# Patient Record
Sex: FEMALE
[De-identification: names, ages, dates, MRNs, and addresses within clinical notes are randomized per-mention and may not be internally consistent; named-entity substitution may affect disease eponyms.]

---

## 2018-03-16 PROBLEM — Z00.00 ENCOUNTER FOR PREVENTIVE HEALTH EXAMINATION: Status: ACTIVE | Noted: 2018-03-16

## 2019-07-17 ENCOUNTER — APPOINTMENT (OUTPATIENT)
Dept: HEART AND VASCULAR | Facility: CLINIC | Age: 69
End: 2019-07-17
Payer: MEDICARE

## 2019-07-17 VITALS — SYSTOLIC BLOOD PRESSURE: 130 MMHG | DIASTOLIC BLOOD PRESSURE: 80 MMHG | HEART RATE: 57 BPM

## 2019-07-17 PROCEDURE — 99214 OFFICE O/P EST MOD 30 MIN: CPT

## 2019-07-17 NOTE — ASSESSMENT
[FreeTextEntry1] : 69-year-old female with a past medical history of obesity, hypertension, hyperlipidemia, depression here for a followup evaluation and first visit with this provider\par \par LOERA\par --The patient reports worsening of her dyspnea on exertion recently associated with episodes of chest pain\par -In the setting of mildly elevated  right ventricular systolic pressures on echo today, rule out for exercise-induced pulmonary hypertension and CAD with a stress echocardiogram\par \par HTN\par -well controlled \par -cont with current medications\par \par HLD\par -fasting lipid panel\par \par LE edema\par -duplex lower extremities to r/o dvt in the setting of asymmetrical edema \par \par f/u in 1 month for stress echo results

## 2019-07-17 NOTE — PHYSICAL EXAM
[Heart Rate And Rhythm] : heart rate and rhythm were normal [Heart Sounds] : normal S1 and S2 [Murmurs] : no murmurs present [Respiration, Rhythm And Depth] : normal respiratory rhythm and effort [Exaggerated Use Of Accessory Muscles For Inspiration] : no accessory muscle use [Auscultation Breath Sounds / Voice Sounds] : lungs were clear to auscultation bilaterally [Abdomen Soft] : soft [Abdomen Tenderness] : non-tender [Abdomen Mass (___ Cm)] : no abdominal mass palpated [Nail Clubbing] : no clubbing of the fingernails [Cyanosis, Localized] : no localized cyanosis [Petechial Hemorrhages (___cm)] : no petechial hemorrhages [] : no ischemic changes [FreeTextEntry1] : 1+ pitting edema b/l ankles with R>L

## 2019-07-17 NOTE — HISTORY OF PRESENT ILLNESS
[FreeTextEntry1] : 69-year-old female with a past medical history of obesity,  hypertension, hyperlipidemia, depression here for a followup evaluation and first visit with this provider\par The patient reports episodes of shortness of breath when walking in the last 6 months. \par SHe reports that she can walk up to 5 blocks ut limited by shortness of breath. \par She also reports LE edema (chronic) with right leg> left \par \par PMH\par obesity\par HTN\par HLD\par deperssion\par  \par \par ALL\par meloxicam (?)\par \par SH\par no smoke, social etoh, no drigs\par \par Medications\par Losartan/hydrochlorothiazide 50/12.5 mg daily\par simvastatin 40 mg daily\par Atenolol 25 mg daily \par \par EKG 7/17/2019 SR, wnl\par \par Echo 2/18/2018 EF of 61%\par \par Echocardiogram 7/17/2019 (preliminary report)\par Normal left ventricular systolic function (EF 55%), mild dilated left atrium, grade 1 diastolic dysfunction, mild MR, mild TR, mildly elevated right ventricular systolic pressure (RVSP 33 mmHG) \par \par Nuclear  stress test to 2/2/2018: Adenosine myocardial perfusion imaging is negative for ischemia.  normal LV function with an EF 70%

## 2019-09-04 ENCOUNTER — APPOINTMENT (OUTPATIENT)
Dept: HEART AND VASCULAR | Facility: CLINIC | Age: 69
End: 2019-09-04
Payer: MEDICARE

## 2019-09-04 VITALS — HEART RATE: 64 BPM | DIASTOLIC BLOOD PRESSURE: 60 MMHG | SYSTOLIC BLOOD PRESSURE: 90 MMHG

## 2019-09-04 PROCEDURE — 99215 OFFICE O/P EST HI 40 MIN: CPT

## 2019-09-05 NOTE — ASSESSMENT
[FreeTextEntry1] : 69-year-old female with a past medical history of obesity, hypertension, hyperlipidemia, depression here for a followup evaluation \par \par LOERA\par -The patient reports dyspnea on exertion stable for few months\par -denies exertional angina or palpitations\par -EKG portion of stress test is negative as per preliminary report\par -obtain official stress echo report\par -if stress echo is negative, would continue with work up of non cardiac etiologies of LOERA\par \par HTN\par -well controlled \par -cont with current medications\par \par HLD\par -fasting lipid panel wnl\par \par LE edema\par -duplex lower extremities negative for DVT\par \par f/u in 1 month for stress echo results

## 2019-09-05 NOTE — HISTORY OF PRESENT ILLNESS
[FreeTextEntry1] : 69-year-old female with a past medical history of obesity,  hypertension, hyperlipidemia, depression here for a follow up and obtain stress echo results. \par The patient reports episodes of shortness of breath when walking in the last 6 months. \par SHe reports that she can walk up to 5 blocks ut limited by shortness of breath. \par She also reports LE edema (chronic) with right leg> left \par She denies chest pain, palpitations, syncope presyncope\par \par PMH\par obesity\par HTN\par HLD\par depression\par  \par \par ALL\par meloxicam (?)\par \par SH\par no smoke, social etoh, no drigs\par \par Medications\par Losartan/hydrochlorothiazide 50/12.5 mg daily\par simvastatin 40 mg daily\par Atenolol 25 mg daily \par \par EKG 7/17/2019 SR, wnl\par \par Echo 2/18/2018 EF of 61%\par \par Echocardiogram 7/17/2019\par Normal left ventricular systolic function (EF 55%), mild dilated left atrium, grade 1 diastolic dysfunction, mild MR, mild TR, mildly elevated right ventricular systolic pressure (RVSP 33 mmHG) \par \par Nuclear  stress test to 2/2/2018: Adenosine myocardial perfusion imaging is negative for ischemia.  normal LV function with an EF 70%\par \par Stress echo 8/28/2019 (Echo report pending)\par 89% of maximum predicted heart rate\par Assessment time 6 minutes 25 seconds and stopped due to fatigue\par Up sloping ST depression in lead 2\par EKG was negative for ischemia\par \par Ultrasound lower extremity 8/8/2019\par No evidence of deep or superficial vein thrombosis bilaterally\par \par Labs 8/22/2019\par Cholesterol 130\par LDL 58\par HDL 46\par

## 2022-05-05 ENCOUNTER — OUTPATIENT (OUTPATIENT)
Dept: OUTPATIENT SERVICES | Facility: HOSPITAL | Age: 72
LOS: 1 days | End: 2022-05-05

## 2022-05-05 ENCOUNTER — APPOINTMENT (OUTPATIENT)
Dept: CT IMAGING | Facility: CLINIC | Age: 72
End: 2022-05-05
Payer: MEDICARE

## 2022-05-05 PROCEDURE — 75574 CT ANGIO HRT W/3D IMAGE: CPT | Mod: 26
